# Patient Record
Sex: FEMALE | Race: BLACK OR AFRICAN AMERICAN | Employment: PART TIME | URBAN - METROPOLITAN AREA
[De-identification: names, ages, dates, MRNs, and addresses within clinical notes are randomized per-mention and may not be internally consistent; named-entity substitution may affect disease eponyms.]

---

## 2022-07-02 ENCOUNTER — HOSPITAL ENCOUNTER (EMERGENCY)
Age: 37
Discharge: HOME | End: 2022-07-02
Attending: EMERGENCY MEDICINE
Payer: MEDICAID

## 2022-07-02 VITALS
HEART RATE: 77 BPM | RESPIRATION RATE: 16 BRPM | DIASTOLIC BLOOD PRESSURE: 98 MMHG | SYSTOLIC BLOOD PRESSURE: 155 MMHG | TEMPERATURE: 98.4 F | OXYGEN SATURATION: 100 %

## 2022-07-02 DIAGNOSIS — M54.50 LEFT LOW BACK PAIN, UNSPECIFIED CHRONICITY, UNSPECIFIED WHETHER SCIATICA PRESENT: Primary | ICD-10-CM

## 2022-07-02 LAB
BILIRUBIN URINE: NEGATIVE
BLOOD, URINE: NEGATIVE
CLARITY: CLEAR
COLOR: YELLOW
GLUCOSE URINE: NEGATIVE MG/DL
HCG(URINE) PREGNANCY TEST: NEGATIVE
KETONES, URINE: NEGATIVE MG/DL
LEUKOCYTE ESTERASE, URINE: NEGATIVE
MICROSCOPIC EXAMINATION: NORMAL
NITRITE, URINE: NEGATIVE
PH UA: 6.5 (ref 5–8)
PROTEIN UA: NEGATIVE MG/DL
SPECIFIC GRAVITY UA: 1.01 (ref 1–1.03)
URINE TYPE: NORMAL
UROBILINOGEN, URINE: 0.2 E.U./DL

## 2022-07-02 PROCEDURE — 81003 URINALYSIS AUTO W/O SCOPE: CPT

## 2022-07-02 PROCEDURE — 96374 THER/PROPH/DIAG INJ IV PUSH: CPT

## 2022-07-02 PROCEDURE — 99284 EMERGENCY DEPT VISIT MOD MDM: CPT

## 2022-07-02 PROCEDURE — 84703 CHORIONIC GONADOTROPIN ASSAY: CPT

## 2022-07-02 PROCEDURE — 6360000002 HC RX W HCPCS: Performed by: STUDENT IN AN ORGANIZED HEALTH CARE EDUCATION/TRAINING PROGRAM

## 2022-07-02 PROCEDURE — 6370000000 HC RX 637 (ALT 250 FOR IP): Performed by: STUDENT IN AN ORGANIZED HEALTH CARE EDUCATION/TRAINING PROGRAM

## 2022-07-02 RX ORDER — METHOCARBAMOL 500 MG/1
500 TABLET, FILM COATED ORAL 4 TIMES DAILY
Qty: 40 TABLET | Refills: 0 | Status: SHIPPED | OUTPATIENT
Start: 2022-07-02 | End: 2022-07-12

## 2022-07-02 RX ORDER — LIDOCAINE 50 MG/G
1 PATCH TOPICAL DAILY
Qty: 30 PATCH | Refills: 0 | Status: SHIPPED | OUTPATIENT
Start: 2022-07-02

## 2022-07-02 RX ORDER — IBUPROFEN 800 MG/1
400 TABLET ORAL EVERY 8 HOURS PRN
Qty: 15 TABLET | Refills: 0 | Status: SHIPPED | OUTPATIENT
Start: 2022-07-02

## 2022-07-02 RX ORDER — LIDOCAINE 4 G/G
1 PATCH TOPICAL ONCE
Status: DISCONTINUED | OUTPATIENT
Start: 2022-07-02 | End: 2022-07-02 | Stop reason: HOSPADM

## 2022-07-02 RX ORDER — KETOROLAC TROMETHAMINE 30 MG/ML
15 INJECTION, SOLUTION INTRAMUSCULAR; INTRAVENOUS ONCE
Status: COMPLETED | OUTPATIENT
Start: 2022-07-02 | End: 2022-07-02

## 2022-07-02 RX ORDER — OXYCODONE HYDROCHLORIDE AND ACETAMINOPHEN 5; 325 MG/1; MG/1
1 TABLET ORAL ONCE
Status: COMPLETED | OUTPATIENT
Start: 2022-07-02 | End: 2022-07-02

## 2022-07-02 RX ORDER — METHOCARBAMOL 500 MG/1
750 TABLET, FILM COATED ORAL ONCE
Status: COMPLETED | OUTPATIENT
Start: 2022-07-02 | End: 2022-07-02

## 2022-07-02 RX ADMIN — METHOCARBAMOL TABLETS 750 MG: 500 TABLET, COATED ORAL at 12:31

## 2022-07-02 RX ADMIN — KETOROLAC TROMETHAMINE 15 MG: 30 INJECTION, SOLUTION INTRAMUSCULAR at 13:28

## 2022-07-02 RX ADMIN — OXYCODONE HYDROCHLORIDE AND ACETAMINOPHEN 1 TABLET: 5; 325 TABLET ORAL at 12:32

## 2022-07-02 ASSESSMENT — PAIN SCALES - GENERAL
PAINLEVEL_OUTOF10: 10
PAINLEVEL_OUTOF10: 7
PAINLEVEL_OUTOF10: 10
PAINLEVEL_OUTOF10: 4

## 2022-07-02 ASSESSMENT — PAIN DESCRIPTION - LOCATION
LOCATION: BACK

## 2022-07-02 ASSESSMENT — PAIN DESCRIPTION - DESCRIPTORS
DESCRIPTORS: ACHING
DESCRIPTORS: ACHING

## 2022-07-02 ASSESSMENT — PAIN - FUNCTIONAL ASSESSMENT
PAIN_FUNCTIONAL_ASSESSMENT: 0-10
PAIN_FUNCTIONAL_ASSESSMENT: 0-10

## 2022-07-02 NOTE — ED TRIAGE NOTES
Pt arrived to ED with lower back pain since yesterday. Denies injury. Took ibuprofen and flexeril with minimal relief.

## 2022-07-02 NOTE — ED NOTES
Pt medicated according to orders. Pt identify verified using two patient identifiers. Allergies reviewed prior to medication administration. Pt resp even and unlabored, skin natural in color, no signs of acute distress. Please see MAR for additional information regarding administration.         Colt Hurd RN  07/02/22 6509

## 2022-07-02 NOTE — ED PROVIDER NOTES
[id number] RESIDENT NOTE       Date of evaluation: 7/2/2022    Chief Complaint     Back Pain      of Present Illness     Summer Marrero is a 42 y.o. female who presents with back pain. The pain started yesterday when she was getting out of the shower. There is no particular trauma or other inciting events. She is otherwise been in baseline health without any fever congestion cough. She states that she did have a similar episode roughly 10 years ago for which she did not require care and recovered well. She did take over-the-counter pain relievers last night which seemed to transiently improve her condition and she was able to sleep however had persistent pain today which prompted her to seek evaluation in the emergency department. She localizes the pain to the lumbar back along the paraspinal muscles left greater than right with some radiation to bilateral flanks as well as with paresthesias to the buttocks and thighs. She is ambulatory without assistance and has no other acute complaints. The pain is an aching pain which is worse when she changes position. The pain is constant as well as the paresthesias. She has had no associate symptoms. She specifically denies any changes in her bowel or bladder continence.     Review of Systems     Constitutional: no fever, chills, unexpected weight change  Skin: no rashes or lesions  HEENT: no head trauma, headache; no vision changes, eye pain; no nasal discharge or congestion; no ear pain or change in hearing; no neck or throat pain  Respiratory: no shortness of breath or cough  Cardiovascular: no chest pain, palpitations, peripheral edema  Gastrointestinal: no abdominal pain, no change in appetite, no nausea, vomiting, constipation, diarrhea, no blood in stools  Genitourinary: no changes in urination; no pain with urination  Musculoskeletal: + back pain   Neuro: no syncope or seizure  Psych: no changes in mood or depression      Past Medical, Surgical, Family, and Social History     She has a past medical history of Hypertension. She has a past surgical history that includes  section. Her family history is not on file. She reports that she has never smoked. She has never used smokeless tobacco. She reports current alcohol use. She reports that she does not use drugs. Medications     Previous Medications    CYCLOBENZAPRINE (FLEXERIL) 10 MG TABLET    Take 1 tablet by mouth 3 times daily as needed for Muscle spasms    HYDROCHLOROTHIAZIDE (HYDRODIURIL) 25 MG TABLET    Take 1 tablet by mouth daily. Allergies     She is allergic to banana. Physical Exam     INITIAL VITALS: BP: (!) 155/98, Temp: 98.4 °F (36.9 °C), Heart Rate: 77, Resp: 16, SpO2: 100 %     General: alert and conversant in no distress  Skin: warm, dry and pink without noted rashes or lesions  Head: normocephalic atraumatic  Eyes: Pupils equal, extra ocular movements grossly intact  Mouth / Pharynx: moist mucus membranes without erythema or lesions noted  Neck: full range of motion without meningismus  Chest: no external findings or tenderness; clear to auscultation  Heart: regular normal S1 and S2 without murmur  noted  Abdomen: negative external findings, + bowel sounds, soft and non tender; no rebound; non distended  Extremities: well perfused with palpable distal pulses; full range of motion grossly intact, no edema  Back: Mild tenderness to the paraspinal muscles bilaterally left greater than right along the lumbar spine no other abnormalities  Neuro: GCS 15 cranial nerves II through XII grossly intact, bilateral upper extremity motor and sensory grossly intact. Bilateral lower extremity with motor strength intact patient has a normal gait including heels and toes. She does endorse some paresthesias along the anterior thighs and perhaps lateral to buttocks without overt numbness. There is no saddle anesthesia.     DiagnosticResults EKG   na    RADIOLOGY:  No orders to display       LABS:   Results for orders placed or performed during the hospital encounter of 07/02/22   Urinalysis   Result Value Ref Range    Color, UA Yellow Straw/Yellow    Clarity, UA Clear Clear    Glucose, Ur Negative Negative mg/dL    Bilirubin Urine Negative Negative    Ketones, Urine Negative Negative mg/dL    Specific Gravity, UA 1.015 1.005 - 1.030    Blood, Urine Negative Negative    pH, UA 6.5 5.0 - 8.0    Protein, UA Negative Negative mg/dL    Urobilinogen, Urine 0.2 <2.0 E.U./dL    Nitrite, Urine Negative Negative    Leukocyte Esterase, Urine Negative Negative    Microscopic Examination Not Indicated     Urine Type NotGiven    Pregnancy, Urine   Result Value Ref Range    HCG(Urine) Pregnancy Test Negative Detects HCG level >20 MIU/mL       ED BEDSIDE ULTRASOUND:  na    RECENT VITALS:  BP: (!) 155/98, Temp: 98.4 °F (36.9 °C), Heart Rate: 77,Resp: 16, SpO2: 100 %     Procedures     na    ED Course     Nursing Notes, Past Medical Hx, Past Surgical Hx, Social Hx, Allergies, and Family Hx were reviewed. The patient was given the followingmedications:  Orders Placed This Encounter   Medications    oxyCODONE-acetaminophen (PERCOCET) 5-325 MG per tablet 1 tablet    lidocaine 4 % external patch 1 patch    methocarbamol (ROBAXIN) tablet 750 mg    ketorolac (TORADOL) injection 15 mg    ibuprofen (ADVIL;MOTRIN) 800 MG tablet     Sig: Take 0.5 tablets by mouth every 8 hours as needed for Pain (Take with food)     Dispense:  15 tablet     Refill:  0    methocarbamol (ROBAXIN) 500 MG tablet     Sig: Take 1 tablet by mouth 4 times daily for 10 days     Dispense:  40 tablet     Refill:  0    lidocaine (LIDODERM) 5 %     Sig: Place 1 patch onto the skin daily 12 hours on, 12 hours off.      Dispense:  30 patch     Refill:  0       CONSULTS:  None    MEDICAL DECISION MAKING / ASSESSMENT / Lara Jeison is a 42 y.o. female presents with lumbar back pain with bilateral radiculopathy left greater than right. There are no high risk features to her presentation. She will benefit from multimodal pain control for symptomatic management but otherwise requires no emergent imaging or other interventions and will be referred to sports medicine or orthopedics for further outpatient management should her condition fail to improve in the coming weeks. The remainder of her ED course is as follows:    ED Course as of 07/02/22 1357   Sat Jul 02, 2022   1357 Patient is reassessed her condition is improved after medications given here. Her work-up is reviewed and is without actionable abnormality. Given this course there is no indication for further testing or treatment in the emergency department patient is appropriate for discharge. She will be given some medications to support her recovery at home as well as referral should her condition fail to improve. Patient is agreeable with this plan and this concludes her ED course. [NG]      ED Course User Index  [NG] Asad Munson MD         This patient was also evaluated by the attending physician. All care plans werediscussed and agreed upon. Clinical Impression     1. Left low back pain, unspecified chronicity, unspecified whether sciatica present        Disposition     PATIENT REFERRED TO:  [address] [place] [address]  [phone]  Schedule an appointment as soon as possible for a visit in 1 month  If symptoms worsen      DISCHARGE MEDICATIONS:  New Prescriptions    IBUPROFEN (ADVIL;MOTRIN) 800 MG TABLET    Take 0.5 tablets by mouth every 8 hours as needed for Pain (Take with food)    LIDOCAINE (LIDODERM) 5 %    Place 1 patch onto the skin daily 12 hours on, 12 hours off.     METHOCARBAMOL (ROBAXIN) 500 MG TABLET    Take 1 tablet by mouth 4 times daily for 10 days       DISPOSITION    -Discharge to home, multimodal pain control to support recovery, encouragement to remain mobile and to follow-up to a spine specialist should her condition fail to improve.      Cheryl Hammer MD  Resident  07/02/22 [id number]

## 2022-07-02 NOTE — ED NOTES
Pt medicated according to orders. Pt identify verified using two patient identifiers. Allergies reviewed prior to medication administration. Pt resp even and unlabored, skin natural in color, no signs of acute distress. Please see MAR for additional information regarding administration.         Lauren Hernandez RN  07/02/22 2003 shower only

## 2024-07-25 ENCOUNTER — HOSPITAL ENCOUNTER (EMERGENCY)
Age: 39
Discharge: LEFT BEFORE DISCHARGE | End: 2024-07-25

## 2024-07-25 VITALS
OXYGEN SATURATION: 100 % | HEIGHT: 66 IN | DIASTOLIC BLOOD PRESSURE: 93 MMHG | WEIGHT: 293 LBS | RESPIRATION RATE: 16 BRPM | HEART RATE: 84 BPM | BODY MASS INDEX: 47.09 KG/M2 | SYSTOLIC BLOOD PRESSURE: 164 MMHG | TEMPERATURE: 98 F